# Patient Record
Sex: MALE | Race: ASIAN | NOT HISPANIC OR LATINO | Employment: FULL TIME | URBAN - METROPOLITAN AREA
[De-identification: names, ages, dates, MRNs, and addresses within clinical notes are randomized per-mention and may not be internally consistent; named-entity substitution may affect disease eponyms.]

---

## 2024-06-14 ENCOUNTER — APPOINTMENT (OUTPATIENT)
Dept: PRIMARY CARE | Facility: CLINIC | Age: 34
End: 2024-06-14
Payer: COMMERCIAL

## 2024-06-14 VITALS — SYSTOLIC BLOOD PRESSURE: 121 MMHG | DIASTOLIC BLOOD PRESSURE: 71 MMHG | WEIGHT: 197 LBS

## 2024-06-14 DIAGNOSIS — A63.0 GENITAL WARTS: ICD-10-CM

## 2024-06-14 DIAGNOSIS — M54.50 LOW BACK PAIN WITHOUT SCIATICA, UNSPECIFIED BACK PAIN LATERALITY, UNSPECIFIED CHRONICITY: ICD-10-CM

## 2024-06-14 DIAGNOSIS — Z00.00 HEALTH CARE MAINTENANCE: Primary | ICD-10-CM

## 2024-06-14 PROCEDURE — 99385 PREV VISIT NEW AGE 18-39: CPT | Performed by: INTERNAL MEDICINE

## 2024-06-14 NOTE — PROGRESS NOTES
Subjective   Patient ID: Leandro Chowdhury is a 34 y.o. male who presents for No chief complaint on file..    HPI CPE Sipp to differentiate met patient for first time no chest pain no shortness of breath recently diagnosed with genital warts although thinks he uses condom all of the time had been to the dermatologist has upcoming appointment with [facility] otherwise good general health bones muscles joints okay    Past medical history unremarkable    Medications none at the current time    Allergies no known drug allergies    Social history no tobacco alcohol rare    Family history father diabetes mellitus    Prevention some exercise no recent blood work    Review of Systems    Objective   There were no vitals taken for this visit.    Physical Exam vital signs noted alert and oriented x 3 NCAT PERRLA EOMI nares without discharge OP benign TM normal bilateral EAC clear bilateral no AC nodes no JVD or bruit no thyromegaly chest clear to auscultation CV regular rate and rhythm S1-S2 without murmur gallop or rub abdomen soft nontender normal active bowel sounds LS spine normal curvature negative straight leg raise negative logroll negative SI joint tenderness  he declined extremities no clubbing cyanosis or edema normal distal pulses DTR 2+    Assessment/Plan    impression General medical examination low back pain genital warts  Plan has been having some low back pain playing cricket it often gets better the day after there is no physical finding may have some left-sided lower muscular discomfort may use heating pad and Tylenol good hydration check Chem-7 advised on glucose potassium and kidney function check CBC advised on blood count check HIV and RPR and advise and follow-up with urology requisition made check lipid panel advised on cholesterol profile good diet regular exercise increase water consumption back hygiene back stretches recheck based on above and then after initial evaluation may have HPV vaccine  TT 50 cc 26